# Patient Record
Sex: FEMALE | Race: WHITE | NOT HISPANIC OR LATINO | Employment: OTHER | URBAN - METROPOLITAN AREA
[De-identification: names, ages, dates, MRNs, and addresses within clinical notes are randomized per-mention and may not be internally consistent; named-entity substitution may affect disease eponyms.]

---

## 2021-06-07 ENCOUNTER — OFFICE VISIT (OUTPATIENT)
Dept: CARDIOLOGY | Facility: CLINIC | Age: 66
End: 2021-06-07

## 2021-06-07 VITALS
SYSTOLIC BLOOD PRESSURE: 112 MMHG | HEIGHT: 64 IN | OXYGEN SATURATION: 98 % | BODY MASS INDEX: 30.39 KG/M2 | WEIGHT: 178 LBS | HEART RATE: 84 BPM | DIASTOLIC BLOOD PRESSURE: 72 MMHG

## 2021-06-07 DIAGNOSIS — I20.8 ANGINAL EQUIVALENT (HCC): ICD-10-CM

## 2021-06-07 DIAGNOSIS — R94.39 ABNORMAL NUCLEAR STRESS TEST: ICD-10-CM

## 2021-06-07 DIAGNOSIS — R07.2 PRECORDIAL CHEST PAIN: Primary | ICD-10-CM

## 2021-06-07 PROCEDURE — 93000 ELECTROCARDIOGRAM COMPLETE: CPT | Performed by: INTERNAL MEDICINE

## 2021-06-07 PROCEDURE — 99204 OFFICE O/P NEW MOD 45 MIN: CPT | Performed by: INTERNAL MEDICINE

## 2021-06-07 RX ORDER — EMPAGLIFLOZIN 25 MG/1
25 TABLET, FILM COATED ORAL DAILY
COMMUNITY

## 2021-06-07 RX ORDER — ONDANSETRON 4 MG/1
TABLET, ORALLY DISINTEGRATING ORAL
COMMUNITY
Start: 2021-06-03

## 2021-06-07 RX ORDER — BUSPIRONE HYDROCHLORIDE 5 MG/1
5 TABLET ORAL 2 TIMES DAILY
COMMUNITY
End: 2022-05-18 | Stop reason: ALTCHOICE

## 2021-06-07 RX ORDER — ERGOCALCIFEROL 1.25 MG/1
50000 CAPSULE ORAL WEEKLY
COMMUNITY

## 2021-06-07 RX ORDER — SUCRALFATE 1 G/1
1 TABLET ORAL 2 TIMES DAILY
COMMUNITY

## 2021-06-07 RX ORDER — LANOLIN ALCOHOL/MO/W.PET/CERES
2500 CREAM (GRAM) TOPICAL DAILY
COMMUNITY

## 2021-06-07 RX ORDER — CARVEDILOL 12.5 MG/1
12.5 TABLET ORAL 2 TIMES DAILY WITH MEALS
COMMUNITY
End: 2021-07-15 | Stop reason: SDUPTHER

## 2021-06-07 RX ORDER — INSULIN GLARGINE 100 [IU]/ML
40 INJECTION, SOLUTION SUBCUTANEOUS DAILY
Status: ON HOLD | COMMUNITY
End: 2021-06-29

## 2021-06-07 RX ORDER — PANTOPRAZOLE SODIUM 40 MG/1
40 TABLET, DELAYED RELEASE ORAL DAILY
COMMUNITY

## 2021-06-07 RX ORDER — CETIRIZINE HYDROCHLORIDE 10 MG/1
10 TABLET ORAL DAILY
COMMUNITY
Start: 2021-05-10

## 2021-06-07 RX ORDER — ASPIRIN 81 MG/1
81 TABLET ORAL 2 TIMES DAILY
Status: ON HOLD | COMMUNITY
End: 2021-06-29 | Stop reason: SDUPTHER

## 2021-06-07 RX ORDER — MECLIZINE HYDROCHLORIDE 25 MG/1
25 TABLET ORAL AS NEEDED
COMMUNITY
Start: 2021-05-27

## 2021-06-07 RX ORDER — ACETAMINOPHEN 500 MG
500 TABLET ORAL EVERY 6 HOURS PRN
COMMUNITY

## 2021-06-07 NOTE — PROGRESS NOTES
[facility] Cardiology  [address], [address]  [place], [place], [address] [phone]  WWW.[facility] CenterCustomerXPs SoftwareBarton County [facility] Hospital           OUTPATIENT CLINIC CONSULTATION NOTE    Patient care team:  Patient Care Team:  Ashutosh Obrien DO as PCP - General (Family Medicine)    Requesting Provider and Reason for consultation: The patient is being seen today at the request of Dr Obrien for chest pain, abnormal stress test.     Subjective:   Chief complaint:   Chief Complaint   Patient presents with   • Chest Pain   • Dizziness   • Shortness of Breath       HPI:    Vy Bowser is a 66 y.o. female.  Partial problem list, including cardiac problems:  1. Chest pressure syndrome  a. Mother with MI and stents in her 40s  2. Palpitations   a. PVC on EKG 3/2021  3. Neck arthritis  4. Covid possibly in winter 2019 and recurrent episode 10/2020  a. Long-haul symptoms since 10/2020 episode including chest pain, palpitations, fatigue  5. Essential hypertension  6. Diabetes  7. Mixed hyperlipidemia  8. Obesity     The patient had Covid in October.  Subsequent long-haul symptoms.  Included chest pressure, fatigue, palpitations.    Started aspirin 3 months ago.  Started carvedilol few months ago as well.  Symptoms slowly have improved.  Less dyspneic.  Had chest pressure since last fall, centrally located, for the last couple months, lasting hours at a time, not exertional, no clear aggravating or relieving factors.  Not associated with significant dyspnea, diaphoresis, nausea, emesis.  Chest pressure has improved somewhat, but continues to have symptoms.  Discomfort in the left arm, left chest.  Also with neck arthritis, easily exacerbated by use of that arm.    Infrequent palpitations.  Denies lightheadedness, syncope.    No history of significant tobacco use.  Mother with an MI and subsequent stents in her 40s.   of complications from bypass surgery    Review of Systems:  Positive for chest pressure,  joint pain, fatigue  All other systems are reviewed and are negative    PFSH:  There is no problem list on file for this patient.        Current Outpatient Medications:   •  acetaminophen (TYLENOL) 500 MG tablet, Take 500 mg by mouth Every 6 (Six) Hours As Needed for Mild Pain ., Disp: , Rfl:   •  aspirin 81 MG EC tablet, Take 81 mg by mouth Daily., Disp: , Rfl:   •  busPIRone (BUSPAR) 5 MG tablet, Take 5 mg by mouth 2 (two) times a day., Disp: , Rfl:   •  carvedilol (COREG) 12.5 MG tablet, Take 12.5 mg by mouth 2 (Two) Times a Day With Meals., Disp: , Rfl:   •  cetirizine (zyrTEC) 10 MG tablet, Take 10 mg by mouth Daily., Disp: , Rfl:   •  Empagliflozin (Jardiance) 25 MG tablet, Take 25 mg by mouth Daily., Disp: , Rfl:   •  Insulin Glargine (BASAGLAR KWIKPEN) 100 UNIT/ML injection pen, Inject 40 Units under the skin into the appropriate area as directed Daily., Disp: , Rfl:   •  magnesium oxide (MAGOX) 400 (241.3 Mg) MG tablet tablet, Take 400 mg by mouth Daily., Disp: , Rfl:   •  meclizine (ANTIVERT) 25 MG tablet, Take 25 mg by mouth As Needed., Disp: , Rfl:   •  metFORMIN (GLUCOPHAGE) 1000 MG tablet, Take 1,000 mg by mouth 2 (Two) Times a Day With Meals., Disp: , Rfl:   •  ondansetron ODT (ZOFRAN-ODT) 4 MG disintegrating tablet, , Disp: , Rfl:   •  pantoprazole (PROTONIX) 40 MG EC tablet, Take 40 mg by mouth Daily., Disp: , Rfl:   •  ProAir  (90 Base) MCG/ACT inhaler, Inhale 1 puff 4 (Four) Times a Day., Disp: , Rfl:   •  sucralfate (CARAFATE) 1 g tablet, Take 1 g by mouth 2 (two) times a day., Disp: , Rfl:   •  vitamin B-12 (CYANOCOBALAMIN) 1000 MCG tablet, Take 2,500 mcg by mouth Daily., Disp: , Rfl:   •  vitamin D (ERGOCALCIFEROL) 1.25 MG (30904 UT) capsule capsule, Take 50,000 Units by mouth 1 (One) Time Per Week., Disp: , Rfl:     Allergies   Allergen Reactions   • Reglan [Metoclopramide] Anaphylaxis   • Ceclor [Cefaclor] Other (See Comments)     Secondary infection   • Contrast Dye Hives and Itching  "  • Sulfa Antibiotics Swelling and Rash       Social History     Socioeconomic History   • Marital status:      Spouse name: Not on file   • Number of children: Not on file   • Years of education: Not on file   • Highest education level: Not on file   Tobacco Use   • Smoking status: Never Smoker   • Smokeless tobacco: Never Used   Substance and Sexual Activity   • Alcohol use: Not Currently   • Drug use: Never   • Sexual activity: Defer     Family History   Problem Relation Age of Onset   • Heart disease Mother    • Heart failure Mother    • Stroke Father          Objective:   Physical Exam:  /72 (BP Location: Left arm, Patient Position: Sitting)   Pulse 84   Ht 162.6 cm (64\")   Wt 80.7 kg (178 lb)   SpO2 98%   BMI 30.55 kg/m²   CONSTITUTIONAL: Well-nourished. In no acute distress.   SKIN: Warm and dry. No rashes noted  HEENT: Head is normocephalic and atraumatic. Pupils are equal and reactive to light bilaterally. Mucous membranes are pink and moist.   NECK: Supple without masses or thyromegaly. There is no jugular venous distention   LUNGS: Normal effort. Clear to auscultation bilaterally without wheezing, rhonchi, or rales noted.   CARDIOVASCULAR: Regular rate and rhythm with a normal S1 and S2. There is no murmur, gallop, rub, or click appreciated. Carotid upstrokes are 2+ and symmetrical without bruits.  2+ radial pulses bilaterally.  There is no peripheral edema.   ABDOMEN: Normal bowel sounds.  Soft and nondistended. No tenderness with palpitation.   MUSCULOSKELETAL:  No digital cyanosis  NEUROLOGICAL: Nonfocal.  PSYCHIATRIC: Alert, orientated x 3, appropriate affect and mood    6/7/2021 exam: 2+ DP pulses bilaterally.    Labs:  No results found for: BUN, CREATININE, K, ALT, AST  No results found for: WBC, HGB, HCT, PLT    No results found for: CHOL  No results found for: TRIG  No results found for: HDL  No results found for: LDL  No components found for: LDLDIRECTC    PCP labs 3/2021: TC " 141, , LDL 64, HDL 50, creatinine 0.94, sodium 140, AST 20, ALT 28, hemoglobin 12, platelets 338, TSH normal 1.63    Diagnostic Data:      ECG 12 Lead    Date/Time: 6/7/2021 10:23 AM  Performed by: Lalo Mccullough MD  Authorized by: Lalo Mccullough MD   Comparison: compared with previous ECG from 3/11/2021  Comparison to previous ECG: PVC no longer present  Rhythm: sinus rhythm            Echo, [facility] facility, 4/2021: EF 65%, no significant valve disease    Lexiscan nuclear, [facility] facility, 4/2021: Inferior ischemia with some adjacent artifact due to bowel loop, SDS 6, EF 69%,    Assessment and Plan:   Precordial chest pain   Anginal equivalent   Abnormal nuclear stress test  Essential hypertension  Diabetes  -Some of the patient's symptoms are concerning for angina.  Multiple risk factors for CAD including a strong family history.  -Now with an abnormal stress test concerning for ischemia  -While some of the patient's symptoms may be related to Covid, cannot rule out symptomatic CAD.  -Recommend definitive evaluation with coronary angiogram, possible PCI.  Planned approach through left radial artery if Barbeau type is acceptable on the day of the study  -Continue aspirin, beta-blocker  -Patient deliberately not on a statin due to controlled cholesterol levels historically.  If with significant CAD, will discuss risk/benefit of statin addition        - Return for Follow-up to be determined after heart catheterization.    Lalo Mccullough MD, MSc, FACC, [facility]  Interventional Cardiology  [facility]

## 2021-06-08 PROBLEM — I20.89 ANGINAL EQUIVALENT: Status: ACTIVE | Noted: 2021-06-08

## 2021-06-08 PROBLEM — R94.39 ABNORMAL NUCLEAR STRESS TEST: Status: ACTIVE | Noted: 2021-06-08

## 2021-06-08 PROBLEM — I20.8 ANGINAL EQUIVALENT (HCC): Status: ACTIVE | Noted: 2021-06-08

## 2021-06-09 ENCOUNTER — PREP FOR SURGERY (OUTPATIENT)
Dept: OTHER | Facility: HOSPITAL | Age: 66
End: 2021-06-09

## 2021-06-09 DIAGNOSIS — R94.39 ABNORMAL STRESS TEST: Primary | ICD-10-CM

## 2021-06-09 RX ORDER — ASPIRIN 81 MG/1
81 TABLET ORAL DAILY
Status: CANCELLED | OUTPATIENT
Start: 2021-06-10

## 2021-06-09 RX ORDER — ASPIRIN 81 MG/1
324 TABLET, CHEWABLE ORAL ONCE
Status: CANCELLED | OUTPATIENT
Start: 2021-06-09 | End: 2021-06-09

## 2021-06-09 RX ORDER — SODIUM CHLORIDE 0.9 % (FLUSH) 0.9 %
10 SYRINGE (ML) INJECTION AS NEEDED
Status: CANCELLED | OUTPATIENT
Start: 2021-06-09

## 2021-06-09 RX ORDER — PREDNISONE 50 MG/1
50 TABLET ORAL DAILY
Qty: 3 TABLET | Refills: 0 | Status: SHIPPED | OUTPATIENT
Start: 2021-06-09 | End: 2021-06-12

## 2021-06-09 RX ORDER — SODIUM CHLORIDE 0.9 % (FLUSH) 0.9 %
3 SYRINGE (ML) INJECTION EVERY 12 HOURS SCHEDULED
Status: CANCELLED | OUTPATIENT
Start: 2021-06-09

## 2021-06-09 NOTE — TELEPHONE ENCOUNTER
Patient notified of pre-medications at pharmacy for contrast dye allergy. Patient agreeable to plan and verbalizes understanding.

## 2021-06-29 ENCOUNTER — HOSPITAL ENCOUNTER (OUTPATIENT)
Facility: HOSPITAL | Age: 66
Discharge: HOME | End: 2021-06-29
Attending: INTERNAL MEDICINE | Admitting: INTERNAL MEDICINE

## 2021-06-29 VITALS
HEART RATE: 91 BPM | BODY MASS INDEX: 29.97 KG/M2 | TEMPERATURE: 97 F | RESPIRATION RATE: 18 BRPM | DIASTOLIC BLOOD PRESSURE: 73 MMHG | OXYGEN SATURATION: 93 % | WEIGHT: 179.9 LBS | SYSTOLIC BLOOD PRESSURE: 123 MMHG | HEIGHT: 65 IN

## 2021-06-29 DIAGNOSIS — I20.8 ANGINAL EQUIVALENT (HCC): ICD-10-CM

## 2021-06-29 DIAGNOSIS — R94.39 ABNORMAL STRESS TEST: ICD-10-CM

## 2021-06-29 DIAGNOSIS — R94.39 ABNORMAL NUCLEAR STRESS TEST: ICD-10-CM

## 2021-06-29 LAB
ACT BLD: 263 SECONDS (ref 82–152)
ANION GAP SERPL CALCULATED.3IONS-SCNC: 17 MMOL/L (ref 5–15)
BUN SERPL-MCNC: 23 MG/DL (ref 8–23)
BUN/CREAT SERPL: 31.5 (ref 7–25)
CALCIUM SPEC-SCNC: 9.7 MG/DL (ref 8.6–10.5)
CHLORIDE SERPL-SCNC: 99 MMOL/L (ref 98–107)
CHOLEST SERPL-MCNC: 131 MG/DL (ref 0–200)
CO2 SERPL-SCNC: 21 MMOL/L (ref 22–29)
CREAT SERPL-MCNC: 0.73 MG/DL (ref 0.57–1)
DEPRECATED RDW RBC AUTO: 50.4 FL (ref 37–54)
ERYTHROCYTE [DISTWIDTH] IN BLOOD BY AUTOMATED COUNT: 20.1 % (ref 12.3–15.4)
GFR SERPL CREATININE-BSD FRML MDRD: 80 ML/MIN/1.73
GLUCOSE BLDC GLUCOMTR-MCNC: 145 MG/DL (ref 70–130)
GLUCOSE SERPL-MCNC: 228 MG/DL (ref 65–99)
HBA1C MFR BLD: 7 % (ref 4.8–5.6)
HCT VFR BLD AUTO: 39.9 % (ref 34–46.6)
HDLC SERPL-MCNC: 55 MG/DL (ref 40–60)
HGB BLD-MCNC: 11.1 G/DL (ref 12–15.9)
LDLC SERPL CALC-MCNC: 64 MG/DL (ref 0–100)
LDLC/HDLC SERPL: 1.19 {RATIO}
MCH RBC QN AUTO: 20.7 PG (ref 26.6–33)
MCHC RBC AUTO-ENTMCNC: 27.8 G/DL (ref 31.5–35.7)
MCV RBC AUTO: 74.3 FL (ref 79–97)
PLATELET # BLD AUTO: 372 10*3/MM3 (ref 140–450)
PMV BLD AUTO: 9.2 FL (ref 6–12)
POTASSIUM SERPL-SCNC: 4.5 MMOL/L (ref 3.5–5.2)
QT INTERVAL: 392 MS
QTC INTERVAL: 482 MS
RBC # BLD AUTO: 5.37 10*6/MM3 (ref 3.77–5.28)
SODIUM SERPL-SCNC: 137 MMOL/L (ref 136–145)
TRIGL SERPL-MCNC: 54 MG/DL (ref 0–150)
VLDLC SERPL-MCNC: 12 MG/DL (ref 5–40)
WBC # BLD AUTO: 8.34 10*3/MM3 (ref 3.4–10.8)

## 2021-06-29 PROCEDURE — 25010000002 MIDAZOLAM PER 1 MG: Performed by: INTERNAL MEDICINE

## 2021-06-29 PROCEDURE — C1769 GUIDE WIRE: HCPCS | Performed by: INTERNAL MEDICINE

## 2021-06-29 PROCEDURE — 93571 IV DOP VEL&/PRESS C FLO 1ST: CPT | Performed by: INTERNAL MEDICINE

## 2021-06-29 PROCEDURE — 92928 PRQ TCAT PLMT NTRAC ST 1 LES: CPT | Performed by: INTERNAL MEDICINE

## 2021-06-29 PROCEDURE — 80061 LIPID PANEL: CPT | Performed by: NURSE PRACTITIONER

## 2021-06-29 PROCEDURE — C1894 INTRO/SHEATH, NON-LASER: HCPCS | Performed by: INTERNAL MEDICINE

## 2021-06-29 PROCEDURE — 25010000002 HEPARIN (PORCINE) PER 1000 UNITS: Performed by: INTERNAL MEDICINE

## 2021-06-29 PROCEDURE — 92978 ENDOLUMINL IVUS OCT C 1ST: CPT | Performed by: INTERNAL MEDICINE

## 2021-06-29 PROCEDURE — C1725 CATH, TRANSLUMIN NON-LASER: HCPCS | Performed by: INTERNAL MEDICINE

## 2021-06-29 PROCEDURE — 93458 L HRT ARTERY/VENTRICLE ANGIO: CPT | Performed by: INTERNAL MEDICINE

## 2021-06-29 PROCEDURE — 85027 COMPLETE CBC AUTOMATED: CPT | Performed by: NURSE PRACTITIONER

## 2021-06-29 PROCEDURE — 80048 BASIC METABOLIC PNL TOTAL CA: CPT | Performed by: NURSE PRACTITIONER

## 2021-06-29 PROCEDURE — 0 IOPAMIDOL PER 1 ML: Performed by: INTERNAL MEDICINE

## 2021-06-29 PROCEDURE — 85347 COAGULATION TIME ACTIVATED: CPT

## 2021-06-29 PROCEDURE — C1887 CATHETER, GUIDING: HCPCS | Performed by: INTERNAL MEDICINE

## 2021-06-29 PROCEDURE — 25010000002 FENTANYL CITRATE (PF) 50 MCG/ML SOLUTION: Performed by: INTERNAL MEDICINE

## 2021-06-29 PROCEDURE — 93005 ELECTROCARDIOGRAM TRACING: CPT | Performed by: INTERNAL MEDICINE

## 2021-06-29 PROCEDURE — 83036 HEMOGLOBIN GLYCOSYLATED A1C: CPT | Performed by: NURSE PRACTITIONER

## 2021-06-29 PROCEDURE — C1874 STENT, COATED/COV W/DEL SYS: HCPCS | Performed by: INTERNAL MEDICINE

## 2021-06-29 PROCEDURE — 82962 GLUCOSE BLOOD TEST: CPT

## 2021-06-29 PROCEDURE — C9600 PERC DRUG-EL COR STENT SING: HCPCS | Performed by: INTERNAL MEDICINE

## 2021-06-29 PROCEDURE — C1753 CATH, INTRAVAS ULTRASOUND: HCPCS | Performed by: INTERNAL MEDICINE

## 2021-06-29 DEVICE — XIENCE SIERRA™ EVEROLIMUS ELUTING CORONARY STENT SYSTEM 3.50 MM X 23 MM / RAPID-EXCHANGE
Type: IMPLANTABLE DEVICE | Status: FUNCTIONAL
Brand: XIENCE SIERRA™

## 2021-06-29 DEVICE — XIENCE SIERRA™ EVEROLIMUS ELUTING CORONARY STENT SYSTEM 2.50 MM X 15 MM / RAPID-EXCHANGE
Type: IMPLANTABLE DEVICE | Status: FUNCTIONAL
Brand: XIENCE SIERRA™

## 2021-06-29 RX ORDER — ASPIRIN 81 MG/1
81 TABLET ORAL DAILY
Qty: 90 TABLET | Refills: 3 | Status: SHIPPED | OUTPATIENT
Start: 2021-06-29

## 2021-06-29 RX ORDER — SODIUM CHLORIDE 9 MG/ML
3 INJECTION, SOLUTION INTRAVENOUS CONTINUOUS
Status: ACTIVE | OUTPATIENT
Start: 2021-06-29 | End: 2021-06-29

## 2021-06-29 RX ORDER — ROSUVASTATIN CALCIUM 10 MG/1
10 TABLET, COATED ORAL DAILY
Qty: 90 TABLET | Refills: 3 | Status: SHIPPED | OUTPATIENT
Start: 2021-06-29 | End: 2021-10-07 | Stop reason: SDUPTHER

## 2021-06-29 RX ORDER — CLOPIDOGREL BISULFATE 75 MG/1
75 TABLET ORAL DAILY
Qty: 90 TABLET | Refills: 3 | Status: SHIPPED | OUTPATIENT
Start: 2021-06-29 | End: 2021-10-07 | Stop reason: SDUPTHER

## 2021-06-29 RX ORDER — SODIUM CHLORIDE 9 MG/ML
1 INJECTION, SOLUTION INTRAVENOUS CONTINUOUS
Status: DISCONTINUED | OUTPATIENT
Start: 2021-06-29 | End: 2021-06-29 | Stop reason: HOSPADM

## 2021-06-29 RX ORDER — HEPARIN SODIUM 1000 [USP'U]/ML
INJECTION, SOLUTION INTRAVENOUS; SUBCUTANEOUS AS NEEDED
Status: DISCONTINUED | OUTPATIENT
Start: 2021-06-29 | End: 2021-06-29 | Stop reason: HOSPADM

## 2021-06-29 RX ORDER — ASPIRIN 81 MG/1
81 TABLET ORAL DAILY
Status: DISCONTINUED | OUTPATIENT
Start: 2021-06-30 | End: 2021-06-29 | Stop reason: HOSPADM

## 2021-06-29 RX ORDER — MIDAZOLAM HYDROCHLORIDE 1 MG/ML
INJECTION INTRAMUSCULAR; INTRAVENOUS AS NEEDED
Status: DISCONTINUED | OUTPATIENT
Start: 2021-06-29 | End: 2021-06-29 | Stop reason: HOSPADM

## 2021-06-29 RX ORDER — SODIUM CHLORIDE 0.9 % (FLUSH) 0.9 %
10 SYRINGE (ML) INJECTION AS NEEDED
Status: DISCONTINUED | OUTPATIENT
Start: 2021-06-29 | End: 2021-06-29 | Stop reason: HOSPADM

## 2021-06-29 RX ORDER — LIDOCAINE HYDROCHLORIDE 10 MG/ML
INJECTION, SOLUTION EPIDURAL; INFILTRATION; INTRACAUDAL; PERINEURAL AS NEEDED
Status: DISCONTINUED | OUTPATIENT
Start: 2021-06-29 | End: 2021-06-29 | Stop reason: HOSPADM

## 2021-06-29 RX ORDER — ACETAMINOPHEN 325 MG/1
650 TABLET ORAL EVERY 4 HOURS PRN
Status: DISCONTINUED | OUTPATIENT
Start: 2021-06-29 | End: 2021-06-29 | Stop reason: HOSPADM

## 2021-06-29 RX ORDER — ASPIRIN 81 MG/1
324 TABLET, CHEWABLE ORAL ONCE
Status: COMPLETED | OUTPATIENT
Start: 2021-06-29 | End: 2021-06-29

## 2021-06-29 RX ORDER — SODIUM CHLORIDE 0.9 % (FLUSH) 0.9 %
3 SYRINGE (ML) INJECTION EVERY 12 HOURS SCHEDULED
Status: DISCONTINUED | OUTPATIENT
Start: 2021-06-29 | End: 2021-06-29 | Stop reason: HOSPADM

## 2021-06-29 RX ORDER — CLOPIDOGREL BISULFATE 75 MG/1
TABLET ORAL AS NEEDED
Status: DISCONTINUED | OUTPATIENT
Start: 2021-06-29 | End: 2021-06-29 | Stop reason: HOSPADM

## 2021-06-29 RX ORDER — FENTANYL CITRATE 50 UG/ML
INJECTION, SOLUTION INTRAMUSCULAR; INTRAVENOUS AS NEEDED
Status: DISCONTINUED | OUTPATIENT
Start: 2021-06-29 | End: 2021-06-29 | Stop reason: HOSPADM

## 2021-06-29 RX ADMIN — ASPIRIN 81 MG CHEWABLE TABLET 324 MG: 81 TABLET CHEWABLE at 08:01

## 2021-06-29 RX ADMIN — SODIUM CHLORIDE 3 ML/KG/HR: 9 INJECTION, SOLUTION INTRAVENOUS at 07:58

## 2021-06-30 ENCOUNTER — DOCUMENTATION (OUTPATIENT)
Dept: CARDIAC REHAB | Facility: HOSPITAL | Age: 66
End: 2021-06-30

## 2021-06-30 ENCOUNTER — CALL CENTER PROGRAMS (OUTPATIENT)
Dept: CALL CENTER | Facility: HOSPITAL | Age: 66
End: 2021-06-30

## 2021-06-30 NOTE — OUTREACH NOTE
PCI/Device Survey      Responses   Facility patient discharged from?  [place]   Procedure date  06/29/21   Procedure (if device, specify in description)  PCI   PCI site  Left, Arm   Performing MD Dr. Lalo Mccullough   Attempt successful?  No   Unsuccessful attempts  Attempt 2          Asia Shaw RN

## 2021-06-30 NOTE — OUTREACH NOTE
PCI/Device Survey      Responses   Facility patient discharged from?  [place]   Procedure date  06/29/21   Procedure (if device, specify in description)  PCI   PCI site  Left, Arm   Performing MD Dr. Lalo Mccullough   Attempt successful?  No   Unsuccessful attempts  Attempt 1          Asia Shaw RN

## 2021-07-02 ENCOUNTER — DOCUMENTATION (OUTPATIENT)
Dept: CARDIAC REHAB | Facility: HOSPITAL | Age: 66
End: 2021-07-02

## 2021-07-02 NOTE — PROGRESS NOTES
Cardiac Rehab staff mailed referral letter to patient regarding Phase II Cardiac Rehab program. Instruction for patient to contact [facility] Cardiac Rehab Department for additional program information and to forward referral to closest facility.

## 2021-07-15 RX ORDER — CARVEDILOL 12.5 MG/1
12.5 TABLET ORAL 2 TIMES DAILY WITH MEALS
Qty: 60 TABLET | Refills: 11 | Status: SHIPPED | OUTPATIENT
Start: 2021-07-15

## 2021-08-30 ENCOUNTER — OFFICE VISIT (OUTPATIENT)
Dept: CARDIOLOGY | Facility: CLINIC | Age: 66
End: 2021-08-30

## 2021-08-30 VITALS
HEART RATE: 89 BPM | DIASTOLIC BLOOD PRESSURE: 68 MMHG | WEIGHT: 180 LBS | SYSTOLIC BLOOD PRESSURE: 112 MMHG | BODY MASS INDEX: 29.99 KG/M2 | HEIGHT: 65 IN | OXYGEN SATURATION: 97 %

## 2021-08-30 DIAGNOSIS — I25.10 CORONARY ARTERY DISEASE INVOLVING NATIVE CORONARY ARTERY OF NATIVE HEART WITHOUT ANGINA PECTORIS: Primary | ICD-10-CM

## 2021-08-30 DIAGNOSIS — I10 ESSENTIAL HYPERTENSION: ICD-10-CM

## 2021-08-30 PROCEDURE — 99214 OFFICE O/P EST MOD 30 MIN: CPT | Performed by: NURSE PRACTITIONER

## 2021-08-30 RX ORDER — AMOXICILLIN AND CLAVULANATE POTASSIUM 875; 125 MG/1; MG/1
1 TABLET, FILM COATED ORAL 2 TIMES DAILY
COMMUNITY
Start: 2021-08-25 | End: 2022-05-18

## 2021-08-30 RX ORDER — PROCHLORPERAZINE 25 MG/1
SUPPOSITORY RECTAL SEE ADMIN INSTRUCTIONS
COMMUNITY
Start: 2021-07-28

## 2021-08-30 RX ORDER — MAGNESIUM OXIDE 400 MG/1
1 TABLET ORAL DAILY
COMMUNITY
Start: 2021-07-19

## 2021-08-30 RX ORDER — SUMATRIPTAN 50 MG/1
TABLET, FILM COATED ORAL
COMMUNITY
Start: 2021-08-27

## 2021-08-30 RX ORDER — CLINDAMYCIN HYDROCHLORIDE 300 MG/1
300 CAPSULE ORAL 3 TIMES DAILY
COMMUNITY
Start: 2021-08-02 | End: 2021-08-30

## 2021-08-30 RX ORDER — BLOOD SUGAR DIAGNOSTIC
1 STRIP MISCELLANEOUS 3 TIMES DAILY
COMMUNITY
Start: 2021-08-12

## 2021-08-30 RX ORDER — BACLOFEN 10 MG/1
10 TABLET ORAL 3 TIMES DAILY
COMMUNITY
Start: 2021-08-06 | End: 2022-05-18 | Stop reason: ALTCHOICE

## 2021-08-30 RX ORDER — MONTELUKAST SODIUM 10 MG/1
10 TABLET ORAL DAILY
COMMUNITY
Start: 2021-06-19

## 2021-08-30 RX ORDER — FLUCONAZOLE 150 MG/1
TABLET ORAL
COMMUNITY
Start: 2021-07-12 | End: 2021-08-30

## 2021-08-30 RX ORDER — CITALOPRAM 20 MG/1
20 TABLET ORAL DAILY
COMMUNITY
Start: 2021-07-23 | End: 2022-05-18 | Stop reason: ALTCHOICE

## 2021-08-30 RX ORDER — HYDROCODONE BITARTRATE AND ACETAMINOPHEN 5; 325 MG/1; MG/1
1 TABLET ORAL EVERY 4 HOURS PRN
COMMUNITY
Start: 2021-06-23

## 2021-08-30 RX ORDER — PROCHLORPERAZINE 25 MG/1
SUPPOSITORY RECTAL
COMMUNITY
Start: 2021-07-29

## 2021-08-30 RX ORDER — SODIUM CHLORIDE 0.65 %
AEROSOL, SPRAY (ML) NASAL
COMMUNITY
Start: 2021-07-13

## 2021-08-30 RX ORDER — IBUPROFEN 600 MG/1
600 TABLET ORAL EVERY 6 HOURS PRN
COMMUNITY
Start: 2021-06-23

## 2021-08-30 RX ORDER — INSULIN GLARGINE 100 [IU]/ML
INJECTION, SOLUTION SUBCUTANEOUS
COMMUNITY
Start: 2021-07-14

## 2021-08-30 RX ORDER — CHOLECALCIFEROL (VITAMIN D3) 1250 MCG
CAPSULE ORAL
COMMUNITY
Start: 2021-08-29 | End: 2021-08-30

## 2021-08-30 NOTE — PROGRESS NOTES
[facility] Cardiology   [address], [address]  [place], [place], [address]    [phone]  WWW.[facility]           OUTPATIENT CLINIC FOLLOW UP NOTE    Patient Care Team:  Patient Care Team:  Ashutosh Obrien DO as PCP - General (Family Medicine)    Subjective:      Chief Complaint   Patient presents with   • Follow-up       HPI:    Vy Bowser is a 66 y.o. female.  Problem list:  1. Chest pressure syndrome/CAD  a. Mother with MI and stents in her 40s  b. LHC 6/29/2021: KELTON to the LAD, KELTON to the OM 2  2. Palpitations   a. PVC on EKG 3/2021  3. Neck arthritis  4. Covid possibly in winter 2019 and recurrent episode 10/2020  a. Long-haul symptoms since 10/2020 episode including chest pain, palpitations, fatigue  5. Essential hypertension  6. Diabetes  7. Mixed hyperlipidemia  8. Obesity     She presents today for follow-up.    Since the patient underwent cardiac catheterization she reports that she has had a significant improvement in her symptoms.  Her dizziness has resolved entirely.  She denies chest pain, lower extremity edema, or palpitations.  Reports mild dyspnea with exertion which is unchanged.  Tolerating Crestor.  Has not participated in cardiac rehab.    Review of Systems:  Positive for dyspnea with exertion  Negative for exertional chest pain, lower extremity edema, palpitations, syncope.     PFSH:  Patient Active Problem List   Diagnosis   • Abnormal nuclear stress test   • Anginal equivalent (CMS/HCC)         Current Outpatient Medications:   •  acetaminophen (TYLENOL) 500 MG tablet, Take 500 mg by mouth Every 6 (Six) Hours As Needed for Mild Pain ., Disp: , Rfl:   •  amoxicillin-clavulanate (AUGMENTIN) 875-125 MG per tablet, Take 1 tablet by mouth 2 (Two) Times a Day. for 10 days, Disp: , Rfl:   •  aspirin 81 MG EC tablet, Take 1 tablet by mouth Daily., Disp: 90 tablet, Rfl: 3  •  baclofen (LIORESAL) 10 MG tablet, Take 10 mg by mouth 3 (Three) Times a Day.,  Disp: , Rfl:   •  busPIRone (BUSPAR) 5 MG tablet, Take 5 mg by mouth 2 (two) times a day., Disp: , Rfl:   •  carvedilol (COREG) 12.5 MG tablet, Take 1 tablet by mouth 2 (Two) Times a Day With Meals., Disp: 60 tablet, Rfl: 11  •  cetirizine (zyrTEC) 10 MG tablet, Take 10 mg by mouth Daily., Disp: , Rfl:   •  citalopram (CeleXA) 20 MG tablet, Take 20 mg by mouth Daily., Disp: , Rfl:   •  clopidogrel (PLAVIX) 75 MG tablet, Take 1 tablet by mouth Daily., Disp: 90 tablet, Rfl: 3  •  Continuous Blood Gluc Sensor (Dexcom G6 Sensor), CHANGE EVERY 14 DAYS, Disp: , Rfl:   •  Continuous Blood Gluc Transmit (Dexcom G6 Transmitter) misc, See Admin Instructions., Disp: , Rfl:   •  [employer] Allergy Relief 25 MG tablet, TAKE 2 TABLETS BY MOUTH ONCE FOR 1 DOSE TAKE 1 HOUR PRIOR TO PROCEDURE, Disp: , Rfl:   •  [employer] Saline Nasal [place] 0.65 % nasal spray, SPRAY 1 SPRAY INTO EACH NOSTRIL TWICE A DAY, Disp: , Rfl:   •  Diclofenac Sodium (VOLTAREN) 1 % gel gel, APPLY 1 GRAM TO AFFECTED AREA(S) ONCE DAILY, Disp: , Rfl:   •  Empagliflozin (Jardiance) 25 MG tablet, Take 25 mg by mouth Daily., Disp: , Rfl:   •  HYDROcodone-acetaminophen (NORCO) 5-325 MG per tablet, Take 1 tablet by mouth Every 4 (Four) Hours As Needed. for pain, Disp: , Rfl:   •  ibuprofen (ADVIL,MOTRIN) 600 MG tablet, Take 600 mg by mouth Every 6 (Six) Hours As Needed. for pain, Disp: , Rfl:   •  Lantus SoloStar 100 UNIT/ML injection pen, INJECT 40 UNITS UNDER THE SKIN AT BEDTIME, Disp: , Rfl:   •  magnesium oxide (MAG-OX) 400 MG tablet, Take 1 tablet by mouth Daily., Disp: , Rfl:   •  meclizine (ANTIVERT) 25 MG tablet, Take 25 mg by mouth As Needed., Disp: , Rfl:   •  metFORMIN (GLUCOPHAGE) 1000 MG tablet, Take 1,000 mg by mouth 2 (Two) Times a Day With Meals., Disp: , Rfl:   •  montelukast (SINGULAIR) 10 MG tablet, Take 10 mg by mouth Daily., Disp: , Rfl:   •  ondansetron ODT (ZOFRAN-ODT) 4 MG disintegrating tablet, , Disp: , Rfl:   •  OneTouch Ultra test strip, 1 each by Other  "route 3 (Three) Times a Day. use to test blood sugar 3 times daily, Disp: , Rfl:   •  pantoprazole (PROTONIX) 40 MG EC tablet, Take 40 mg by mouth Daily., Disp: , Rfl:   •  ProAir  (90 Base) MCG/ACT inhaler, Inhale 1 puff Every 6 (Six) Hours As Needed., Disp: , Rfl:   •  rosuvastatin (CRESTOR) 10 MG tablet, Take 1 tablet by mouth Daily., Disp: 90 tablet, Rfl: 3  •  sucralfate (CARAFATE) 1 g tablet, Take 1 g by mouth 2 (two) times a day., Disp: , Rfl:   •  SUMAtriptan (IMITREX) 50 MG tablet, , Disp: , Rfl:   •  vitamin B-12 (CYANOCOBALAMIN) 1000 MCG tablet, Take 2,500 mcg by mouth Daily., Disp: , Rfl:   •  vitamin D (ERGOCALCIFEROL) 1.25 MG (68909 UT) capsule capsule, Take 50,000 Units by mouth 1 (One) Time Per Week., Disp: , Rfl:     Allergies   Allergen Reactions   • Reglan [Metoclopramide] Anaphylaxis   • Ceclor [Cefaclor] Other (See Comments)     Secondary infection   • Contrast Dye Hives and Itching   • Sulfa Antibiotics Swelling and Rash        reports that she has never smoked. She has never used smokeless tobacco.      Objective:   Physical exam:  /68 (BP Location: Left arm, Patient Position: Sitting)   Pulse 89   Ht 165.1 cm (65\")   Wt 81.6 kg (180 lb)   SpO2 97%   BMI 29.95 kg/m²   CONSTITUTIONAL: No acute distress  RESPIRATORY: Normal effort. Clear to auscultation bilaterally without wheezing or rales.  CARDIOVASCULAR: Carotids with normal upstrokes without bruits.  Regular rate and rhythm with normal S1 and S2. Without murmur, gallop or rub. Normal left radial pulse. There is no lower extremity edema bilaterally.    Labs:    BUN   Date Value Ref Range Status   06/29/2021 23 8 - 23 mg/dL Final     Creatinine   Date Value Ref Range Status   06/29/2021 0.73 0.57 - 1.00 mg/dL Final     Potassium   Date Value Ref Range Status   06/29/2021 4.5 3.5 - 5.2 mmol/L Final       Lab Results   Component Value Date    CHOL 131 06/29/2021     Lab Results   Component Value Date    TRIG 54 06/29/2021 "     Lab Results   Component Value Date    HDL 55 06/29/2021     Lab Results   Component Value Date    LDL 64 06/29/2021     No components found for: LDLDIRECTC    PCP labs 3/2021: , , LDL 64, HDL 50, creatinine 0.94, sodium 140, AST 20, ALT 28, hemoglobin 12, platelets 338, TSH normal 1.63    Diagnostic Data:    Procedures    [facility] 6/29/2021  · There was a 60% calcified LAD stenosis that was found to be functionally significant with an IFR of 0.86.  The lesion is status post cutting balloon angioplasty and stenting with a Xience Sona 3.5 x 23 mm drug-eluting stent followed by proximal optimization with a 4.0 mm balloon  · There was an 80% mid OM 2 stenosis that is status post intervention with a Xience Sona 2.5 x 15 mm drug-eluting stent  · Normal left ventricular ejection fraction 65%     Echo, [facility], 4/2021: EF 65%, no significant valve disease     Lexiscan nuclear, [facility], 4/2021: Inferior ischemia with some adjacent artifact due to bowel loop, SDS 6, EF 69%,    Assessment and Plan:   Diagnoses and all orders for this visit:    Coronary artery disease involving native coronary artery of native heart without angina pectoris (Primary)  Essential hypertension  -Currently without angina.  -Continue aspirin, clopidogrel, statin, beta-blocker.  -Repeat lipid panel and LFTs next month.  -We will have cardiac rehab we reach out to the patient.  She would like to participate in cardiac rehab.      - Return in about 6 months (around 2/28/2022) for Next scheduled follow up.    Shannan Larson, AURELIO  08/30/21 10:17 EDT

## 2021-10-07 ENCOUNTER — DOCUMENTATION (OUTPATIENT)
Dept: CARDIAC REHAB | Facility: HOSPITAL | Age: 66
End: 2021-10-07

## 2021-10-07 RX ORDER — ROSUVASTATIN CALCIUM 10 MG/1
10 TABLET, COATED ORAL DAILY
Qty: 90 TABLET | Refills: 3 | Status: SHIPPED | OUTPATIENT
Start: 2021-10-07

## 2021-10-07 RX ORDER — CLOPIDOGREL BISULFATE 75 MG/1
75 TABLET ORAL DAILY
Qty: 90 TABLET | Refills: 3 | Status: SHIPPED | OUTPATIENT
Start: 2021-10-07 | End: 2022-12-12

## 2021-10-07 NOTE — PROGRESS NOTES
Pt. Referred for Phase II Cardiac Rehab. Staff discussed benefits of exercise and program protocol.  Teach back verified.  Permission granted from patient for staff to fax referral information to outlying program at this time.  Staff faxed referral info to  [facility] .

## 2021-10-18 ENCOUNTER — TELEPHONE (OUTPATIENT)
Dept: CARDIOLOGY | Facility: CLINIC | Age: 66
End: 2021-10-18

## 2021-10-18 NOTE — TELEPHONE ENCOUNTER
She is to have a Functional Endoscopic Sinus Surgery, a Bilateral Inferior Turbinate Reduction and Septoplasty on 11/5. They are requesting patient to hold ASA and Plavix for 7-10 days.

## 2021-10-18 NOTE — TELEPHONE ENCOUNTER
Patient is to have nasal surgery with Dr. Morales and is requesting pre-operative cardiac risk assessment.     Patient states that she has had occasional left side pains. Patient reports that pains do not last long enough to take Nitro SL. Patient has no other cardiac complaints at this time.     Of note- did discuss not holding Plavix. Patient had 2 KELTON 6/26/21.

## 2021-10-19 NOTE — TELEPHONE ENCOUNTER
Patient contacted to review recommendations. Patient agreeable to plan, all questions answered at this time.       Letter with recommendations sent to Dr. Rivera's office.

## 2022-05-18 ENCOUNTER — OFFICE VISIT (OUTPATIENT)
Dept: CARDIOLOGY | Facility: CLINIC | Age: 67
End: 2022-05-18

## 2022-05-18 VITALS
SYSTOLIC BLOOD PRESSURE: 122 MMHG | OXYGEN SATURATION: 96 % | HEIGHT: 65 IN | WEIGHT: 180 LBS | BODY MASS INDEX: 29.99 KG/M2 | DIASTOLIC BLOOD PRESSURE: 70 MMHG | HEART RATE: 84 BPM

## 2022-05-18 DIAGNOSIS — I25.10 CORONARY ARTERY DISEASE INVOLVING NATIVE CORONARY ARTERY OF NATIVE HEART WITHOUT ANGINA PECTORIS: ICD-10-CM

## 2022-05-18 DIAGNOSIS — R94.39 ABNORMAL NUCLEAR STRESS TEST: Primary | ICD-10-CM

## 2022-05-18 DIAGNOSIS — E78.2 MIXED HYPERLIPIDEMIA: ICD-10-CM

## 2022-05-18 PROCEDURE — 99214 OFFICE O/P EST MOD 30 MIN: CPT | Performed by: INTERNAL MEDICINE

## 2022-05-18 NOTE — PROGRESS NOTES
[facility]   [address], [address]  [place], [place], [address]    [phone]  WWW[facility]           OUTPATIENT CLINIC FOLLOW UP NOTE    Patient Care Team:  Patient Care Team:  Ashutosh Obrien DO as PCP - General (Family Medicine)    Subjective:      Chief Complaint   Patient presents with   • Coronary artery disease involving native coronary artery of       HPI:    Vy Bowser is a 67 y.o. female.  Problem list:  1. Chest pressure syndrome/CAD  a. Mother with MI and stents in her 40s  b. LHC 6/29/2021: KELTON to the LAD, KELTON to the OM 2  2. Palpitations   a. PVC on EKG 3/2021  3. Neck arthritis  4. Covid possibly in winter 2019 and recurrent episode 10/2020  a. Long-haul symptoms since 10/2020 episode including chest pain, palpitations, fatigue  5. Essential hypertension  6. Diabetes  7. Mixed hyperlipidemia  8. Obesity     She presents today for follow-up.    Patient has been doing well from a cardiac standpoint since her last visit.  Has an occasional left sided chest/flank pain that she describes as a muscle cramp.  These are brief and infrequent.  Denies palpitations, shortness of breath, lower extremity edema, lightheadedness or syncope.  Blood pressure stable at home.  Of note patient sustained a mechanical fall 2 months ago.  Notes she tripped and fell.  Had significant bruising on her right breast and right side that is slowly resolving.    Review of Systems:  As noted in the HPI     PFSH:  Patient Active Problem List   Diagnosis   • Abnormal nuclear stress test   • Anginal equivalent (HCC)         Current Outpatient Medications:   •  acetaminophen (TYLENOL) 500 MG tablet, Take 500 mg by mouth Every 6 (Six) Hours As Needed for Mild Pain ., Disp: , Rfl:   •  aspirin 81 MG EC tablet, Take 1 tablet by mouth Daily., Disp: 90 tablet, Rfl: 3  •  carvedilol (COREG) 12.5 MG tablet, Take 1 tablet by mouth 2 (Two) Times a Day With Meals., Disp: 60 tablet, Rfl:  11  •  cetirizine (zyrTEC) 10 MG tablet, Take 10 mg by mouth Daily., Disp: , Rfl:   •  clopidogrel (PLAVIX) 75 MG tablet, Take 1 tablet by mouth Daily., Disp: 90 tablet, Rfl: 3  •  Continuous Blood Gluc Sensor (Dexcom G6 Sensor), CHANGE EVERY 14 DAYS, Disp: , Rfl:   •  Continuous Blood Gluc Transmit (Dexcom G6 Transmitter) misc, See Admin Instructions., Disp: , Rfl:   •  [employer] Saline Nasal [place] 0.65 % nasal spray, SPRAY 1 SPRAY INTO EACH NOSTRIL TWICE A DAY, Disp: , Rfl:   •  Diclofenac Sodium (VOLTAREN) 1 % gel gel, APPLY 1 GRAM TO AFFECTED AREA(S) ONCE DAILY, Disp: , Rfl:   •  Empagliflozin (Jardiance) 25 MG tablet, Take 25 mg by mouth Daily., Disp: , Rfl:   •  HYDROcodone-acetaminophen (NORCO) 5-325 MG per tablet, Take 1 tablet by mouth Every 4 (Four) Hours As Needed. for pain, Disp: , Rfl:   •  ibuprofen (ADVIL,MOTRIN) 600 MG tablet, Take 600 mg by mouth Every 6 (Six) Hours As Needed. for pain, Disp: , Rfl:   •  Lantus SoloStar 100 UNIT/ML injection pen, INJECT 40 UNITS UNDER THE SKIN AT BEDTIME, Disp: , Rfl:   •  magnesium oxide (MAG-OX) 400 MG tablet, Take 1 tablet by mouth Daily., Disp: , Rfl:   •  meclizine (ANTIVERT) 25 MG tablet, Take 25 mg by mouth As Needed., Disp: , Rfl:   •  metFORMIN (GLUCOPHAGE) 1000 MG tablet, Take 1,000 mg by mouth 2 (Two) Times a Day With Meals., Disp: , Rfl:   •  montelukast (SINGULAIR) 10 MG tablet, Take 10 mg by mouth Daily., Disp: , Rfl:   •  ondansetron ODT (ZOFRAN-ODT) 4 MG disintegrating tablet, , Disp: , Rfl:   •  OneTouch Ultra test strip, 1 each by Other route 3 (Three) Times a Day. use to test blood sugar 3 times daily, Disp: , Rfl:   •  pantoprazole (PROTONIX) 40 MG EC tablet, Take 40 mg by mouth Daily., Disp: , Rfl:   •  ProAir  (90 Base) MCG/ACT inhaler, Inhale 1 puff Every 6 (Six) Hours As Needed., Disp: , Rfl:   •  rosuvastatin (CRESTOR) 10 MG tablet, Take 1 tablet by mouth Daily., Disp: 90 tablet, Rfl: 3  •  sucralfate (CARAFATE) 1 g tablet, Take 1 g by  "mouth 2 (two) times a day., Disp: , Rfl:   •  SUMAtriptan (IMITREX) 50 MG tablet, , Disp: , Rfl:   •  vitamin B-12 (CYANOCOBALAMIN) 1000 MCG tablet, Take 2,500 mcg by mouth Daily., Disp: , Rfl:   •  vitamin D (ERGOCALCIFEROL) 1.25 MG (52230 UT) capsule capsule, Take 50,000 Units by mouth 1 (One) Time Per Week., Disp: , Rfl:     Allergies   Allergen Reactions   • Reglan [Metoclopramide] Anaphylaxis   • Ceclor [Cefaclor] Other (See Comments)     Secondary infection   • Contrast Dye Hives and Itching   • Sulfa Antibiotics Swelling and Rash        reports that she has never smoked. She has never used smokeless tobacco.      Objective:   Physical exam:  /70 (BP Location: Left arm, Patient Position: Sitting)   Pulse 84   Ht 165.1 cm (65\")   Wt 81.6 kg (180 lb)   SpO2 96%   BMI 29.95 kg/m²   CONSTITUTIONAL: No acute distress  RESPIRATORY: Normal effort. Clear to auscultation bilaterally without wheezing or rales.  CARDIOVASCULAR: Carotids with normal upstrokes without bruits.  Regular rate and rhythm with normal S1 and S2. Without murmur, gallop or rub. Normal left radial pulse. There is no lower extremity edema bilaterally.    Labs:      Lab Results   Component Value Date    CHOL 131 06/29/2021     Lab Results   Component Value Date    TRIG 54 06/29/2021     Lab Results   Component Value Date    HDL 55 06/29/2021     Lab Results   Component Value Date    LDL 64 06/29/2021     No components found for: LDLDIRECTC    PCP labs 3/2021: , , LDL 64, HDL 50, creatinine 0.94, sodium 140, AST 20, ALT 28, hemoglobin 12, platelets 338, TSH normal 1.63    Diagnostic Data:    Procedures    C 6/29/2021  · There was a 60% calcified LAD stenosis that was found to be functionally significant with an IFR of 0.86.  The lesion is status post cutting balloon angioplasty and stenting with a Xience Sona 3.5 x 23 mm drug-eluting stent followed by proximal optimization with a 4.0 mm balloon  · There was an 80% mid OM 2 " stenosis that is status post intervention with a Xience Sona 2.5 x 15 mm drug-eluting stent  · Normal left ventricular ejection fraction 65%     Echo, [facility] facility, 4/2021: EF 65%, no significant valve disease     Lexiscan nuclear, [facility], 4/2021: Inferior ischemia with some adjacent artifact due to bowel loop, SDS 6, EF 69%,    Assessment and Plan:     Coronary artery disease involving native coronary artery of native heart without angina pectoris   Essential hypertension  -Currently without angina.  -Continue aspirin, clopidogrel, statin, beta-blocker.  -Discontinue Plavix at one year post-stent placement which will be next month.    -Repeat lipid panel and LFTs next month. Patient is scheduled for check up with PCP and lab work.   -Discussed continuing dietary and lifestyle modifications.  -Patient would like to participate in cardiac rehab.  Will place referral.    - Return in about 1 year (around 5/18/2023) for Next scheduled follow-up with an ECG.    Scribed for Lalo Mccullough MD by AURELIO Salazar. 5/18/2022  10:56 EDT    I, Lalo Mccullough MD, personally performed the services as scribed by the above named individual. I have made any necessary edits and it is both accurate and complete.     Lalo Mccullough MD, MSc, FACC, [facility]  Interventional Cardiology  Psychiatric

## 2022-05-25 ENCOUNTER — DOCUMENTATION (OUTPATIENT)
Dept: CARDIAC REHAB | Facility: HOSPITAL | Age: 67
End: 2022-05-25

## 2022-05-25 NOTE — PROGRESS NOTES
Pt. Referred for Phase II Cardiac Rehab. Staff discussed benefits of exercise, program protocol, and educational material provided. Teach back verified.  Permission granted from patient for staff to fax referral information to outlying program at this time.  Staff faxed referral info to [facility].

## 2022-08-23 ENCOUNTER — TELEPHONE (OUTPATIENT)
Dept: CARDIOLOGY | Facility: CLINIC | Age: 67
End: 2022-08-23

## 2022-08-23 RX ORDER — NITROGLYCERIN 0.4 MG/1
TABLET SUBLINGUAL
Qty: 25 TABLET | Refills: 3 | Status: SHIPPED | OUTPATIENT
Start: 2022-08-23

## 2022-08-23 NOTE — TELEPHONE ENCOUNTER
Caller: Vy Bowser    Relationship: Self    Best call back number: [phone]    Requested Prescriptions:   NITROGLYCERIN     Pharmacy where request should be sent: [facility]      Additional details provided by patient: HASN'T HAD A REFILL FOR A YEAR    Does the patient have less than a 3 day supply:  [x] Yes  [] No

## 2022-12-12 RX ORDER — CLOPIDOGREL BISULFATE 75 MG/1
TABLET ORAL
Qty: 90 TABLET | Refills: 3 | Status: SHIPPED | OUTPATIENT
Start: 2022-12-12

## 2023-05-22 ENCOUNTER — OFFICE VISIT (OUTPATIENT)
Dept: CARDIOLOGY | Facility: CLINIC | Age: 68
End: 2023-05-22
Payer: MEDICARE

## 2023-05-22 VITALS
HEART RATE: 95 BPM | DIASTOLIC BLOOD PRESSURE: 70 MMHG | BODY MASS INDEX: 30.16 KG/M2 | WEIGHT: 181 LBS | OXYGEN SATURATION: 97 % | SYSTOLIC BLOOD PRESSURE: 128 MMHG | HEIGHT: 65 IN

## 2023-05-22 DIAGNOSIS — I10 ESSENTIAL HYPERTENSION: ICD-10-CM

## 2023-05-22 DIAGNOSIS — I25.118 CORONARY ARTERY DISEASE OF NATIVE ARTERY OF NATIVE HEART WITH STABLE ANGINA PECTORIS: Primary | ICD-10-CM

## 2023-05-22 DIAGNOSIS — E78.2 MIXED HYPERLIPIDEMIA: ICD-10-CM

## 2023-05-22 PROCEDURE — 93000 ELECTROCARDIOGRAM COMPLETE: CPT | Performed by: INTERNAL MEDICINE

## 2023-05-22 PROCEDURE — 99214 OFFICE O/P EST MOD 30 MIN: CPT | Performed by: INTERNAL MEDICINE

## 2023-05-22 RX ORDER — BACLOFEN 10 MG/1
1 TABLET ORAL 3 TIMES DAILY
COMMUNITY
Start: 2023-01-30

## 2023-05-22 RX ORDER — BRIMONIDINE TARTRATE 1.5 MG/ML
1 SOLUTION/ DROPS OPHTHALMIC
COMMUNITY
Start: 2022-12-06

## 2023-05-22 RX ORDER — INSULIN LISPRO 100 [IU]/ML
INJECTION, SOLUTION INTRAVENOUS; SUBCUTANEOUS
COMMUNITY
Start: 2023-02-15

## 2023-05-22 NOTE — PROGRESS NOTES
[facility]   [address], [address]  [place], [place], [address] [phone]  [facility]           OUTPATIENT CLINIC FOLLOW UP NOTE    Patient Care Team:  Patient Care Team:  Ashutosh Obrien DO as PCP - General (Family Medicine)    Subjective:      Chief Complaint   Patient presents with   • Abnormal nuclear stress test       HPI:    Vy Bowser is a 68 y.o. female.  Problem list:  1. Chest pressure syndrome/CAD  a. Mother with MI and stents in her 40s  b. [facility], 4/2021: Inferior ischemia with some adjacent artifact due to bowel loop, SDS 6, EF 69%,  c. LHC 6/29/2021: KELTON to the LAD, KELTON to the OM 2  2. Palpitations   a. PVC on EKG 3/2021  3. Neck arthritis  4. Covid possibly in winter 2019 and recurrent episode 10/2020  a. Long-haul symptoms since 10/2020 episode including chest pain, palpitations, fatigue  5. Essential hypertension  6. Diabetes  7. Mixed hyperlipidemia  8. Obesity     She presents today for follow-up.    The patient had an emotional event.  Her significant other had a stroke.  She felt unwell and went to the emergency room when she noticed her blood pressure to be elevated.  Went to [facility].  Underwent cardiac testing which was unremarkable.  Reportedly had a stress test, echo, EKG, lab work.    Has been active throughout the weekend without recurrent symptoms    Review of Systems:  As noted in the HPI     PFSH:  Patient Active Problem List   Diagnosis   • Abnormal nuclear stress test   • Anginal equivalent         Current Outpatient Medications:   •  acetaminophen (TYLENOL) 500 MG tablet, Take 1 tablet by mouth Every 6 (Six) Hours As Needed for Mild Pain., Disp: , Rfl:   •  aspirin 81 MG EC tablet, Take 1 tablet by mouth Daily., Disp: 90 tablet, Rfl: 3  •  baclofen (LIORESAL) 10 MG tablet, Take 1 tablet by mouth 3 (Three) Times a Day., Disp: , Rfl:   •  brimonidine (ALPHAGAN) 0.15 % ophthalmic  solution, Apply 1 drop to eye(s) as directed by provider., Disp: , Rfl:   •  carvedilol (COREG) 12.5 MG tablet, Take 1 tablet by mouth 2 (Two) Times a Day With Meals., Disp: 60 tablet, Rfl: 11  •  cetirizine (zyrTEC) 10 MG tablet, Take 1 tablet by mouth Daily., Disp: , Rfl:   •  Continuous Blood Gluc Sensor (Dexcom G6 Sensor), CHANGE EVERY 14 DAYS, Disp: , Rfl:   •  Continuous Blood Gluc Transmit (Dexcom G6 Transmitter) misc, See Admin Instructions., Disp: , Rfl:   •  CVS Saline Nasal [place] 0.65 % nasal spray, SPRAY 1 SPRAY INTO EACH NOSTRIL TWICE A DAY, Disp: , Rfl:   •  Diclofenac Sodium (VOLTAREN) 1 % gel gel, APPLY 1 GRAM TO AFFECTED AREA(S) ONCE DAILY, Disp: , Rfl:   •  Empagliflozin (Jardiance) 25 MG tablet, Take 1 tablet by mouth Daily., Disp: , Rfl:   •  HYDROcodone-acetaminophen (NORCO) 5-325 MG per tablet, Take 1 tablet by mouth Every 4 (Four) Hours As Needed. for pain, Disp: , Rfl:   •  ibuprofen (ADVIL,MOTRIN) 600 MG tablet, Take 1 tablet by mouth Every 6 (Six) Hours As Needed. for pain, Disp: , Rfl:   •  Insulin Lispro, 1 Unit Dial, (HUMALOG) 100 UNIT/ML solution pen-injector, INJECT 26 UNITS SUBCUTANEOUSLY THREE TIMES DAILY BEFORE MEALS, Disp: , Rfl:   •  Lantus SoloStar 100 UNIT/ML injection pen, INJECT 40 UNITS UNDER THE SKIN AT BEDTIME, Disp: , Rfl:   •  magnesium oxide (MAG-OX) 400 MG tablet, Take 1 tablet by mouth Daily., Disp: , Rfl:   •  meclizine (ANTIVERT) 25 MG tablet, Take 1 tablet by mouth As Needed., Disp: , Rfl:   •  metFORMIN (GLUCOPHAGE) 1000 MG tablet, Take 1 tablet by mouth 2 (Two) Times a Day With Meals., Disp: , Rfl:   •  montelukast (SINGULAIR) 10 MG tablet, Take 1 tablet by mouth Daily., Disp: , Rfl:   •  nitroglycerin (NITROSTAT) 0.4 MG SL tablet, 1 under the tongue as needed for angina, may repeat q5mins for up three doses, Disp: 25 tablet, Rfl: 3  •  ondansetron ODT (ZOFRAN-ODT) 4 MG disintegrating tablet, Place 1 tablet on the tongue Every 8 (Eight) Hours As Needed., Disp: ,  "Rfl:   •  OneTouch Ultra test strip, 1 each by Other route 3 (Three) Times a Day. use to test blood sugar 3 times daily, Disp: , Rfl:   •  pantoprazole (PROTONIX) 40 MG EC tablet, Take 1 tablet by mouth Daily., Disp: , Rfl:   •  ProAir  (90 Base) MCG/ACT inhaler, Inhale 1 puff Every 6 (Six) Hours As Needed., Disp: , Rfl:   •  rosuvastatin (CRESTOR) 10 MG tablet, Take 1 tablet by mouth Daily., Disp: 90 tablet, Rfl: 3  •  sucralfate (CARAFATE) 1 g tablet, Take 1 tablet by mouth 2 (two) times a day., Disp: , Rfl:   •  SUMAtriptan (IMITREX) 50 MG tablet, , Disp: , Rfl:   •  vitamin B-12 (CYANOCOBALAMIN) 1000 MCG tablet, Take 2.5 tablets by mouth Daily., Disp: , Rfl:   •  vitamin D (ERGOCALCIFEROL) 1.25 MG (80249 UT) capsule capsule, Take 1 capsule by mouth 1 (One) Time Per Week., Disp: , Rfl:     Allergies   Allergen Reactions   • Reglan [Metoclopramide] Anaphylaxis   • Ceclor [Cefaclor] Other (See Comments)     Secondary infection   • Contrast Dye (Echo Or Unknown Ct/Mr) Hives and Itching   • Sulfa Antibiotics Swelling and Rash        reports that she has never smoked. She has never been exposed to tobacco smoke. She has never used smokeless tobacco.      Objective:   Physical exam:  /70 (BP Location: Left arm, Patient Position: Sitting)   Pulse 95   Ht 165.1 cm (65\")   Wt 82.1 kg (181 lb)   SpO2 97%   BMI 30.12 kg/m²   CONSTITUTIONAL: No acute distress  RESPIRATORY: Normal effort. Clear to auscultation bilaterally without wheezing or rales.  CARDIOVASCULAR: Carotids with normal upstrokes without bruits.  Regular rate and rhythm with normal S1 and S2. Without murmur, gallop or rub. Normal left radial pulse. There is no lower extremity edema bilaterally.    Labs:      Lab Results   Component Value Date    CHOL 131 06/29/2021     Lab Results   Component Value Date    TRIG 54 06/29/2021     Lab Results   Component Value Date    HDL 55 06/29/2021     Lab Results   Component Value Date    LDL 64 06/29/2021 "     No components found for: LDLDIRECTC    PCP labs 3/2021: , , LDL 64, HDL 50, creatinine 0.94, sodium 140, AST 20, ALT 28, hemoglobin 12, platelets 338, TSH normal 1.63    Diagnostic Data:      ECG 12 Lead    Date/Time: 5/22/2023 4:32 PM  Performed by: Lalo Mccullough MD  Authorized by: Lalo Mccullough MD   Comparison: compared with previous ECG from 6/29/2021  Comparison to previous ECG: Infarct pattern not present  Rhythm: sinus rhythm            [facility] 6/29/2021  · There was a 60% calcified LAD stenosis that was found to be functionally significant with an IFR of 0.86.  The lesion is status post cutting balloon angioplasty and stenting with a Xience Sona 3.5 x 23 mm drug-eluting stent followed by proximal optimization with a 4.0 mm balloon  · There was an 80% mid OM 2 stenosis that is status post intervention with a Xience Sona 2.5 x 15 mm drug-eluting stent  · Normal left ventricular ejection fraction 65%     Echo, [facility] facility, 4/2021: EF 65%, no significant valve disease         Assessment and Plan:     Coronary artery disease   Essential hypertension  -Request made for OSH stress test and echo, which were reportedly negative.  -Continue aspirin, statin, beta-blocker.  -We will request most recent FLP from PCP office  -Discussed continuing dietary and lifestyle modifications.    - Return in about 1 year (around 5/22/2024) for Next scheduled follow-up with an ECG.        Lalo Mccullough MD, MSc, FACC, [facility]  Interventional Cardiology  [facility]

## 2023-12-06 RX ORDER — CLOPIDOGREL BISULFATE 75 MG/1
TABLET ORAL
Qty: 90 TABLET | Refills: 3 | OUTPATIENT
Start: 2023-12-06

## 2023-12-28 RX ORDER — CLOPIDOGREL BISULFATE 75 MG/1
TABLET ORAL
Qty: 90 TABLET | Refills: 3 | OUTPATIENT
Start: 2023-12-28

## 2024-02-27 NOTE — TELEPHONE ENCOUNTER
Caller: Yelitza Bowsere    Relationship: Self    Best call back number: [phone]     Requested Prescriptions:   Requested Prescriptions     Pending Prescriptions Disp Refills    nitroglycerin (NITROSTAT) 0.4 MG SL tablet 25 tablet 3     Si under the tongue as needed for angina, may repeat q5mins for up three doses        Pharmacy where request should be sent: [facility] #[id number] - [place], [place] - [address] [phone] [facility] [facility] [phone] FX     Last office visit with prescribing clinician: 2023   Last telemedicine visit with prescribing clinician: Visit date not found   Next office visit with prescribing clinician: 2024     Does the patient have less than a 3 day supply:  [x] Yes  [] No    Would you like a call back once the refill request has been completed: [x] Yes [] No    If the office needs to give you a call back, can they leave a voicemail: [] Yes [x] No    Anneliese Angel Rep   24 14:06 EST

## 2024-02-28 RX ORDER — NITROGLYCERIN 0.4 MG/1
TABLET SUBLINGUAL
Qty: 25 TABLET | Refills: 3 | Status: SHIPPED | OUTPATIENT
Start: 2024-02-28

## 2024-07-29 ENCOUNTER — OFFICE VISIT (OUTPATIENT)
Dept: CARDIOLOGY | Facility: CLINIC | Age: 69
End: 2024-07-29
Payer: MEDICARE

## 2024-07-29 VITALS
OXYGEN SATURATION: 97 % | HEIGHT: 65 IN | HEART RATE: 86 BPM | BODY MASS INDEX: 31.02 KG/M2 | WEIGHT: 186.2 LBS | DIASTOLIC BLOOD PRESSURE: 58 MMHG | SYSTOLIC BLOOD PRESSURE: 122 MMHG

## 2024-07-29 DIAGNOSIS — E78.2 MIXED HYPERLIPIDEMIA: ICD-10-CM

## 2024-07-29 DIAGNOSIS — I25.118 CORONARY ARTERY DISEASE OF NATIVE ARTERY OF NATIVE HEART WITH STABLE ANGINA PECTORIS: Primary | ICD-10-CM

## 2024-07-29 DIAGNOSIS — I10 ESSENTIAL HYPERTENSION: ICD-10-CM

## 2024-07-29 PROCEDURE — 99214 OFFICE O/P EST MOD 30 MIN: CPT | Performed by: INTERNAL MEDICINE

## 2024-07-29 PROCEDURE — 93000 ELECTROCARDIOGRAM COMPLETE: CPT | Performed by: INTERNAL MEDICINE

## 2024-07-29 NOTE — PROGRESS NOTES
[facility]   [address], [address]  [place], [place], [address] [phone]  [facility]           OUTPATIENT CLINIC FOLLOW UP NOTE    Patient Care Team:  Patient Care Team:  Ashutosh Obrien DO as PCP - General (Family Medicine)  Lalo Mccullough MD as Consulting Physician (Cardiology)    Subjective:      Chief Complaint   Patient presents with    Coronary Artery Disease     Coronary artery disease of native artery of native heart with stable angina pectoris           HPI:    Vy Bowser is a 69 y.o. female.  Problem list:  Chest pressure syndrome/CAD  Mother with MI and stents in her 40s  Echo, [facility], 4/2021: EF 65%, no significant valve disease  Lexiscan nuclear, [facility], 4/2021: Inferior ischemia with some adjacent artifact due to bowel loop, SDS 6, EF 69%,  [facility]HC 6/29/2021: KELTON to the LAD, KELTON to the OM 2  Stress test 5/2023, negative for ischemia, [facility]  Echo 5/2023, LVEF 55%, [facility]  Palpitations   PVC on EKG 3/2021  Essential hypertension  Mixed hyperlipidemia  Diabetes  Neck arthritis  Covid possibly in winter 2019 and recurrent episode 10/2020  Long-haul symptoms since 10/2020 episode including chest pain, palpitations, fatigue  Obesity     She presents today for follow-up.    Denies recurrent chest pain.  Still has an atypical left lateral breast discomfort that occurs at rest.  Walking more indoors at her new living location without cardiopulmonary symptoms.    Review of Systems:  As noted in the HPI     PFSH:  Patient Active Problem List   Diagnosis    Abnormal nuclear stress test    Anginal equivalent         Current Outpatient Medications:     acetaminophen (TYLENOL) 500 MG tablet, Take 1 tablet by mouth Every 6 (Six) Hours As Needed for Mild Pain., Disp: , Rfl:     aspirin 81 MG EC tablet, Take 1 tablet by mouth Daily., Disp: 90 tablet, Rfl: 3    baclofen (LIORESAL) 10 MG tablet, Take 1 tablet by mouth 3  (Three) Times a Day., Disp: , Rfl:     brimonidine (ALPHAGAN) 0.15 % ophthalmic solution, Apply 1 drop to eye(s) as directed by provider., Disp: , Rfl:     carvedilol (COREG) 12.5 MG tablet, Take 1 tablet by mouth 2 (Two) Times a Day With Meals., Disp: 60 tablet, Rfl: 11    cetirizine (zyrTEC) 10 MG tablet, Take 1 tablet by mouth Daily., Disp: , Rfl:     Continuous Blood Gluc Sensor (Dexcom G6 Sensor), CHANGE EVERY 14 DAYS, Disp: , Rfl:     Continuous Blood Gluc Transmit (Dexcom G6 Transmitter) misc, See Admin Instructions., Disp: , Rfl:     CVS Saline Nasal [place] 0.65 % nasal spray, SPRAY 1 SPRAY INTO EACH NOSTRIL TWICE A DAY, Disp: , Rfl:     Diclofenac Sodium (VOLTAREN) 1 % gel gel, APPLY 1 GRAM TO AFFECTED AREA(S) ONCE DAILY, Disp: , Rfl:     Empagliflozin (Jardiance) 25 MG tablet, Take 1 tablet by mouth Daily., Disp: , Rfl:     HYDROcodone-acetaminophen (NORCO) 5-325 MG per tablet, Take 1 tablet by mouth Every 4 (Four) Hours As Needed. for pain, Disp: , Rfl:     ibuprofen (ADVIL,MOTRIN) 600 MG tablet, Take 1 tablet by mouth Every 6 (Six) Hours As Needed. for pain, Disp: , Rfl:     Insulin Lispro, 1 Unit Dial, (HUMALOG) 100 UNIT/ML solution pen-injector, INJECT 26 UNITS SUBCUTANEOUSLY THREE TIMES DAILY BEFORE MEALS, Disp: , Rfl:     Lantus SoloStar 100 UNIT/ML injection pen, INJECT 40 UNITS UNDER THE SKIN AT BEDTIME, Disp: , Rfl:     magnesium oxide (MAG-OX) 400 MG tablet, Take 1 tablet by mouth Daily., Disp: , Rfl:     meclizine (ANTIVERT) 25 MG tablet, Take 1 tablet by mouth As Needed., Disp: , Rfl:     metFORMIN (GLUCOPHAGE) 1000 MG tablet, Take 1 tablet by mouth 2 (Two) Times a Day With Meals., Disp: , Rfl:     montelukast (SINGULAIR) 10 MG tablet, Take 1 tablet by mouth Daily., Disp: , Rfl:     nitroglycerin (NITROSTAT) 0.4 MG SL tablet, 1 under the tongue as needed for angina, may repeat q5mins for up three doses, Disp: 25 tablet, Rfl: 3    ondansetron ODT (ZOFRAN-ODT) 4 MG disintegrating tablet, Place 1  "tablet on the tongue Every 8 (Eight) Hours As Needed., Disp: , Rfl:     OneTouch Ultra test strip, 1 each by Other route 3 (Three) Times a Day. use to test blood sugar 3 times daily, Disp: , Rfl:     pantoprazole (PROTONIX) 40 MG EC tablet, Take 1 tablet by mouth Daily., Disp: , Rfl:     ProAir  (90 Base) MCG/ACT inhaler, Inhale 1 puff Every 6 (Six) Hours As Needed., Disp: , Rfl:     rosuvastatin (CRESTOR) 10 MG tablet, Take 1 tablet by mouth Daily., Disp: 90 tablet, Rfl: 3    sucralfate (CARAFATE) 1 g tablet, Take 1 tablet by mouth 2 (two) times a day., Disp: , Rfl:     SUMAtriptan (IMITREX) 50 MG tablet, , Disp: , Rfl:     vitamin B-12 (CYANOCOBALAMIN) 1000 MCG tablet, Take 2.5 tablets by mouth Daily., Disp: , Rfl:     vitamin D (ERGOCALCIFEROL) 1.25 MG (83037 UT) capsule capsule, Take 1 capsule by mouth 1 (One) Time Per Week., Disp: , Rfl:     Allergies   Allergen Reactions    Cefaclor Other (See Comments), Rash, Swelling and Unknown (See Comments)     Secondary infection    Metoclopramide Anaphylaxis, Other (See Comments) and Rash    Shellfish Allergy Swelling    Sulfa Antibiotics Rash, Swelling, Itching and Shortness Of Breath    Atropine Sulfate Unknown - Low Severity    Iodinated Contrast Media Unknown - Low Severity    Contrast Dye (Echo Or Unknown Ct/Mr) Hives and Itching    Egg-Derived Products Headache    Lactase-Lactobacillus Headache    Naproxen Other (See Comments)        reports that she has never smoked. She has never been exposed to tobacco smoke. She has never used smokeless tobacco.      Objective:   Physical exam:  /58 (BP Location: Left arm, Patient Position: Sitting, Cuff Size: Adult)   Pulse 86   Ht 165.1 cm (65\")   Wt 84.5 kg (186 lb 3.2 oz)   SpO2 97%   BMI 30.99 kg/m²   CONSTITUTIONAL: No acute distress    CARDIOVASCULAR: Carotids with normal upstrokes without bruits.  Regular rate and rhythm with normal S1 and S2. Without murmur.  Normal radial pulse    Labs:      Lab " "Results   Component Value Date    CHOL 131 06/29/2021     Lab Results   Component Value Date    TRIG 54 06/29/2021     Lab Results   Component Value Date    HDL 55 06/29/2021     Lab Results   Component Value Date    LDL 64 06/29/2021     No components found for: \"LDLDIRECTC\"    PCP labs 3/2021: , , LDL 64, HDL 50, creatinine 0.94, sodium 140, AST 20, ALT 28, hemoglobin 12, platelets 338, TSH normal 1.63    Diagnostic Data:      ECG 12 Lead    Date/Time: 7/29/2024 5:01 PM  Performed by: Lalo Mccullough MD    Authorized by: Lalo Mccullough MD  Comparison: compared with previous ECG from 5/22/2023  Similar to previous ECG  Rhythm: sinus rhythm            Assessment and Plan:     Coronary artery disease   Essential hypertension  -Continue aspirin, statin, beta-blocker.  -We will request most recent FLP from PCP office  -Discussed continuing dietary and lifestyle modifications.    - Return in about 1 year (around 7/29/2025) for Next scheduled follow-up with an ECG, Next scheduled follow up with AURELIO Salazar.        Lalo Mccullough MD, MSc, FACC, [facility]  Interventional Cardiology  [facility]  "

## 2025-05-22 ENCOUNTER — TELEPHONE (OUTPATIENT)
Dept: CARDIOLOGY | Facility: CLINIC | Age: 70
End: 2025-05-22

## 2025-05-23 ENCOUNTER — TELEPHONE (OUTPATIENT)
Dept: CARDIOLOGY | Facility: HOSPITAL | Age: 70
End: 2025-05-23
Payer: MEDICARE

## 2025-05-23 NOTE — TELEPHONE ENCOUNTER
Returned patient call, unable to leave a voicemail as her mail box is full, she had left a message wanting to discuss insurance, I was returning the call to inform we are the incorrect department and the phone number for the right department with Dr. Mccullough is [phone]

## 2025-05-27 ENCOUNTER — TELEPHONE (OUTPATIENT)
Dept: CARDIOLOGY | Facility: CLINIC | Age: 70
End: 2025-05-27

## 2025-05-27 NOTE — TELEPHONE ENCOUNTER
SPOKE WITH PT AND SHE WILL BE CALLING HER AGENT AND MEDICARE TO SEE IF SHE CAN SWITCH PLANS TO SOMETHING THAT WILL COVER HER MEDS. PT WILL CALL BACK AND LET US KNOW WHAT SHE WANTS TO DO.

## (undated) DEVICE — PK CATH CARD 10

## (undated) DEVICE — GUIDE CATHETER: Brand: MACH1™

## (undated) DEVICE — MODEL AT P65, P/N 701554-001KIT CONTENTS: HAND CONTROLLER, 3-WAY HIGH-PRESSURE STOPCOCK WITH ROTATING END AND PREMIUM HIGH-PRESSURE TUBING: Brand: ANGIOTOUCH® KIT

## (undated) DEVICE — CATH DIAG EXPO M/ PK 5F FL4/FR4 PIG

## (undated) DEVICE — GW PERIPH GUIDERIGHT STD/EXCHNG/J/TIP SS 0.035IN 5X260CM

## (undated) DEVICE — Device: Brand: ASAHI SION BLUE

## (undated) DEVICE — Device: Brand: OMNIWIRE PRESSURE GUIDE WIRE

## (undated) DEVICE — MICROSURGICAL DILATATION DEVICE: Brand: WOLVERINE CORONARY CUTTING BALLOON

## (undated) DEVICE — DEV COMP RAD PRELUDESYNC 24CM

## (undated) DEVICE — CATH DIAG EXPO .045 FL3  5F 100CM

## (undated) DEVICE — GLIDESHEATH SLENDER STAINLESS STEEL KIT: Brand: GLIDESHEATH SLENDER

## (undated) DEVICE — MODEL BT2000 P/N 700287-012KIT CONTENTS: MANIFOLD WITH SALINE AND CONTRAST PORTS, SALINE TUBING WITH SPIKE AND HAND SYRINGE, TRANSDUCER: Brand: BT2000 AUTOMATED MANIFOLD KIT

## (undated) DEVICE — CATH INTRAVAS ULTRASND EAGLE EYE 2.9FR

## (undated) DEVICE — NC TREK CORONARY DILATATION CATHETER 4.0 MM X 8 MM / RAPID-EXCHANGE: Brand: NC TREK